# Patient Record
Sex: MALE | Race: WHITE | NOT HISPANIC OR LATINO
[De-identification: names, ages, dates, MRNs, and addresses within clinical notes are randomized per-mention and may not be internally consistent; named-entity substitution may affect disease eponyms.]

---

## 2018-03-13 PROBLEM — Z00.00 ENCOUNTER FOR PREVENTIVE HEALTH EXAMINATION: Status: ACTIVE | Noted: 2018-03-13

## 2018-03-29 DIAGNOSIS — M25.562 PAIN IN RIGHT KNEE: ICD-10-CM

## 2018-03-29 DIAGNOSIS — M25.561 PAIN IN RIGHT KNEE: ICD-10-CM

## 2018-04-11 ENCOUNTER — APPOINTMENT (OUTPATIENT)
Dept: ORTHOPEDIC SURGERY | Facility: CLINIC | Age: 68
End: 2018-04-11

## 2022-02-19 ENCOUNTER — APPOINTMENT (OUTPATIENT)
Dept: MRI IMAGING | Facility: IMAGING CENTER | Age: 72
End: 2022-02-19

## 2023-01-31 ENCOUNTER — APPOINTMENT (OUTPATIENT)
Dept: ELECTROPHYSIOLOGY | Facility: CLINIC | Age: 73
End: 2023-01-31
Payer: MEDICARE

## 2023-01-31 ENCOUNTER — NON-APPOINTMENT (OUTPATIENT)
Age: 73
End: 2023-01-31

## 2023-01-31 VITALS
DIASTOLIC BLOOD PRESSURE: 61 MMHG | OXYGEN SATURATION: 95 % | BODY MASS INDEX: 28.85 KG/M2 | WEIGHT: 213 LBS | HEIGHT: 72 IN | HEART RATE: 123 BPM | SYSTOLIC BLOOD PRESSURE: 95 MMHG

## 2023-01-31 DIAGNOSIS — I48.91 UNSPECIFIED ATRIAL FIBRILLATION: ICD-10-CM

## 2023-01-31 DIAGNOSIS — Z87.891 PERSONAL HISTORY OF NICOTINE DEPENDENCE: ICD-10-CM

## 2023-01-31 DIAGNOSIS — I50.9 HEART FAILURE, UNSPECIFIED: ICD-10-CM

## 2023-01-31 DIAGNOSIS — Z78.9 OTHER SPECIFIED HEALTH STATUS: ICD-10-CM

## 2023-01-31 PROCEDURE — 99204 OFFICE O/P NEW MOD 45 MIN: CPT

## 2023-01-31 PROCEDURE — 93000 ELECTROCARDIOGRAM COMPLETE: CPT

## 2023-01-31 RX ORDER — PRAVASTATIN SODIUM 40 MG/1
40 TABLET ORAL DAILY
Refills: 0 | Status: ACTIVE | COMMUNITY
Start: 2023-01-31

## 2023-01-31 RX ORDER — DAPAGLIFLOZIN 10 MG/1
10 TABLET, FILM COATED ORAL
Qty: 90 | Refills: 1 | Status: ACTIVE | COMMUNITY
Start: 2023-01-31

## 2023-01-31 RX ORDER — APIXABAN 5 MG/1
5 TABLET, FILM COATED ORAL
Qty: 180 | Refills: 1 | Status: ACTIVE | COMMUNITY
Start: 2023-01-31

## 2023-01-31 RX ORDER — BUMETANIDE 1 MG/1
1 TABLET ORAL
Qty: 180 | Refills: 3 | Status: ACTIVE | COMMUNITY
Start: 2023-01-31

## 2023-01-31 RX ORDER — SPIRONOLACTONE 25 MG/1
25 TABLET ORAL
Qty: 90 | Refills: 1 | Status: ACTIVE | COMMUNITY
Start: 2023-01-31

## 2023-01-31 RX ORDER — METOPROLOL SUCCINATE 50 MG/1
50 TABLET, EXTENDED RELEASE ORAL
Refills: 0 | Status: ACTIVE | COMMUNITY
Start: 2023-01-31

## 2023-01-31 RX ORDER — METFORMIN HYDROCHLORIDE 500 MG/1
500 TABLET, COATED ORAL TWICE DAILY
Qty: 180 | Refills: 0 | Status: ACTIVE | COMMUNITY
Start: 2023-01-31

## 2023-01-31 RX ORDER — SACUBITRIL AND VALSARTAN 24; 26 MG/1; MG/1
24-26 TABLET, FILM COATED ORAL TWICE DAILY
Qty: 60 | Refills: 3 | Status: ACTIVE | COMMUNITY
Start: 2023-01-31

## 2023-01-31 RX ORDER — AMIODARONE HYDROCHLORIDE 200 MG/1
200 TABLET ORAL
Refills: 0 | Status: ACTIVE | COMMUNITY
Start: 2023-01-31

## 2023-01-31 RX ORDER — CLOPIDOGREL BISULFATE 75 MG/1
75 TABLET, FILM COATED ORAL
Qty: 90 | Refills: 0 | Status: ACTIVE | COMMUNITY
Start: 2023-01-31

## 2023-01-31 NOTE — HISTORY OF PRESENT ILLNESS
[FreeTextEntry1] : Mr VALENTE is a 72 year  man  with hx of HTN, DM, RA, former smoker, recent hospitalized for CHF, AFIB (11/2022)who presents today for initial visit. Reports had fluid overload, dyspnea went to pcp noted Afib on EKG and sent to [facility] ER. Underwent cardiac Cath s/p stent x2 p. Started OMT meds for CHF. Started and remains on Eliquis and amio for afib. States symptoms improved after hospitalization. Has no dyspnea, able to take stairs up and down. Could walk 2 blocks with walker. Denies chest pain. palpitations, sob, syncope.

## 2023-01-31 NOTE — DISCUSSION/SUMMARY
[EKG obtained to assist in diagnosis and management of assessed problem(s)] : EKG obtained to assist in diagnosis and management of assessed problem(s) [FreeTextEntry1] : IMPRESSION: \par \par 1. Persistent Afib: EKG performed today to assess for conduction disease and reveals  AFIB at 93 bpm. Discussed treatment options for AFib including rate control Vs antiarrhythmics Vs possible ablation. Given persistent symptomatic AFib despite rate control management  and  Antiarrhythmics, recommend undergoing DCCV to restore NSR. Risks, benefits and alternatives discussed. Discussed long-term strategies for AFib management such as possible ablation.We would like to optimize patient prior to ablation, given recent CHF (discussed with Dr. Matt Resendiz). Will consider in future. Since newly started Eliquis, <1 WEEK, will undergo CARYL at time of DCCV to ensure no evidence of LA thrombus.\par \par Plan:  CARYL/DCCV

## 2023-02-06 ENCOUNTER — OUTPATIENT (OUTPATIENT)
Dept: OUTPATIENT SERVICES | Facility: HOSPITAL | Age: 73
LOS: 1 days | Discharge: HOME | End: 2023-02-06
Payer: MEDICARE

## 2023-02-06 DIAGNOSIS — Z95.5 PRESENCE OF CORONARY ANGIOPLASTY IMPLANT AND GRAFT: Chronic | ICD-10-CM

## 2023-02-06 DIAGNOSIS — I48.91 UNSPECIFIED ATRIAL FIBRILLATION: ICD-10-CM

## 2023-02-06 LAB — GLUCOSE BLDC GLUCOMTR-MCNC: 117 MG/DL — HIGH (ref 70–99)

## 2023-02-06 PROCEDURE — 76376 3D RENDER W/INTRP POSTPROCES: CPT | Mod: 26

## 2023-02-06 PROCEDURE — 93306 TTE W/DOPPLER COMPLETE: CPT | Mod: 26

## 2023-02-06 PROCEDURE — 93312 ECHO TRANSESOPHAGEAL: CPT | Mod: 26

## 2023-02-06 PROCEDURE — 93010 ELECTROCARDIOGRAM REPORT: CPT | Mod: 76

## 2023-02-06 PROCEDURE — 92960 CARDIOVERSION ELECTRIC EXT: CPT

## 2023-02-06 RX ORDER — SODIUM CHLORIDE 9 MG/ML
3 INJECTION INTRAMUSCULAR; INTRAVENOUS; SUBCUTANEOUS EVERY 8 HOURS
Refills: 0 | Status: DISCONTINUED | OUTPATIENT
Start: 2023-02-06 | End: 2023-02-20

## 2023-02-06 RX ORDER — APIXABAN 2.5 MG/1
1 TABLET, FILM COATED ORAL
Qty: 0 | Refills: 0 | DISCHARGE

## 2023-02-06 RX ORDER — DAPAGLIFLOZIN 10 MG/1
1 TABLET, FILM COATED ORAL
Qty: 0 | Refills: 0 | DISCHARGE

## 2023-02-06 RX ORDER — AMIODARONE HYDROCHLORIDE 400 MG/1
1 TABLET ORAL
Qty: 0 | Refills: 0 | DISCHARGE

## 2023-02-06 RX ORDER — BUMETANIDE 0.25 MG/ML
1 INJECTION INTRAMUSCULAR; INTRAVENOUS
Qty: 0 | Refills: 0 | DISCHARGE

## 2023-02-06 RX ORDER — SPIRONOLACTONE 25 MG/1
1 TABLET, FILM COATED ORAL
Qty: 0 | Refills: 0 | DISCHARGE

## 2023-02-06 RX ORDER — METFORMIN HYDROCHLORIDE 850 MG/1
1 TABLET ORAL
Qty: 0 | Refills: 0 | DISCHARGE

## 2023-02-06 RX ORDER — CLOPIDOGREL BISULFATE 75 MG/1
1 TABLET, FILM COATED ORAL
Qty: 0 | Refills: 0 | DISCHARGE

## 2023-02-06 RX ORDER — SACUBITRIL AND VALSARTAN 24; 26 MG/1; MG/1
1 TABLET, FILM COATED ORAL
Qty: 0 | Refills: 0 | DISCHARGE

## 2023-02-06 RX ORDER — METOPROLOL TARTRATE 50 MG
1 TABLET ORAL
Qty: 0 | Refills: 0 | DISCHARGE

## 2023-02-06 RX ADMIN — SODIUM CHLORIDE 3 MILLILITER(S): 9 INJECTION INTRAMUSCULAR; INTRAVENOUS; SUBCUTANEOUS at 14:29

## 2023-02-06 NOTE — H&P CARDIOLOGY - HISTORY OF PRESENT ILLNESS
This is a  72 year man former smoker with a PMH of HTN, HLD, DM,  CAD, 2 stents (12/2022), RA, CHF, AFIB (11/2022) on Eliquis presented today for elective CARYL/cardioversion. Patient had recent hospitalization for CHF exacerbation likely due to A Fib. Given persistent symptomatic AFib despite rate control management and Antiarrhythmics, the patient was recommended undergoing DCCV to restore NSR.  The process of the procedures along with the risk and benefits for the procedure were explained in detail which included but not limited to stroke, aspiration, esophageal injury, complication from anesthesia.  Patient expressed understanding an all questions were answered.   Patient denies chest pain, SOB, palpitations, dizziness, presyncope, syncope,  headache, visual disturbances, PE, DVT, abdominal pain, N/V/D/C, hematochezia, melena, dysuria, hematuria, fever, chills.    This is a  72 year man former smoker with a PMH of HTN, HLD, DM,  CAD, 2 stents (12/2022), RA, CHF, AFIB (11/2022) on Eliquis presented today for elective CARYL/cardioversion. Patient had recent hospitalization for CHF exacerbation likely due to A Fib. Given persistent symptomatic AFib despite rate control management and Antiarrhythmics, the patient was recommended undergoing DCCV to restore NSR.  The process of the procedures along with the risk and benefits for the procedure were explained in detail which included but not limited to stroke, aspiration, esophageal injury, complication from anesthesia.  Patient expressed understanding an all questions were answered.   Patient denies chest pain, SOB, palpitations, dizziness, presyncope, syncope,  headache, visual disturbances, PE, DVT, abdominal pain, N/V/D/C, hematochezia, melena, dysuria, hematuria, fever, chills.     NPO Date and Time: 2/5/23 at 7 pm  Mallampati: II  Anticoagulation Date and Time? 2/6/23 at 7 am  Previous Endoscopy?  NO  Hx of CVA?  NO  Sleep Apnea?  NO  Dentures? NO  Loose Teeth? NO      Patient Denies:    Prior difficult intubation or airway problems  Odynophagia  Dysphagia  Esophageal stricture  Esophageal tumor  Esophageal varices  Esophageal perforation/laceration  Esophageal diverticulum  Large diaphragmatic Hernia  Active or recent upper gastrointestinal (GI) bleed  History of GI surgery  History of Esophageal surgery  History of Winchester's esophagus  Tenuous cardiorespiratory status  Cervical spine arthritis with reduced range of motion or atlantoaxial joint disease. History of radiation to head, neck, or mediastinum  Severe thrombocytopenia  Obstructive sleep apnea

## 2023-02-06 NOTE — H&P CARDIOLOGY - NSICDXPASTMEDICALHX_GEN_ALL_CORE_FT
PAST MEDICAL HISTORY:  CAD (coronary artery disease)     Chronic systolic congestive heart failure     HLD (hyperlipidemia)     HTN (hypertension)     Permanent atrial fibrillation

## 2023-02-06 NOTE — H&P CARDIOLOGY - REVIEW OF SYSTEMS
Patient denies chest pain, SOB, palpitations, dizziness, presyncope, syncope,  headache, visual disturbances, PE, DVT, abdominal pain, N/V/D/C, hematochezia, melena, dysuria, hematuria, fever, chills

## 2023-03-06 PROBLEM — I48.91 ATRIAL FIBRILLATION STATUS POST CARDIOVERSION: Status: ACTIVE | Noted: 2023-03-06

## 2023-03-07 ENCOUNTER — APPOINTMENT (OUTPATIENT)
Dept: ELECTROPHYSIOLOGY | Facility: CLINIC | Age: 73
End: 2023-03-07

## 2023-03-07 DIAGNOSIS — I48.91 UNSPECIFIED ATRIAL FIBRILLATION: ICD-10-CM
